# Patient Record
(demographics unavailable — no encounter records)

---

## 2025-03-03 NOTE — PHYSICAL EXAM
[JVD] : no jugular venous distention  [Normal Breath Sounds] : Normal breath sounds [Normal Heart Sounds] : normal heart sounds [Normal Rate and Rhythm] : normal rate and rhythm [No Rash or Lesion] : No rash or lesion [Alert] : alert [Oriented to Person] : oriented to person [Oriented to Place] : oriented to place [Oriented to Time] : oriented to time [Calm] : calm [de-identified] : NAD, well-appearing [de-identified] : ANITHA [de-identified] : soft, ND, NT, +reducible LIH, no palpable R side defect. [de-identified] : no CVAT [de-identified] : full ROM

## 2025-03-03 NOTE — ASSESSMENT
[FreeTextEntry1] : 72-year-old male with history of MS and coronary artery disease presents with increasingly symptomatic reducible left inguinal hernia.  On his previous imaging he also has evidence of a small umbilical hernia which we can repair concomitantly.  Recommend robotic repair.

## 2025-03-03 NOTE — HISTORY OF PRESENT ILLNESS
[de-identified] : This patient is a pleasant 72-year-old male with history of multiple sclerosis, BPH, and coronary artery disease who presents for evaluation of left inguinal hernia that has been increasing in size over the past few months.  He reports that during a previous episode the hernia popped out substantially more but he was able to push it back in.  Denies episodes of incarceration or bowel obstruction.  He is a past smoker but not for 40+ years.  Denies immunosuppression.  Denies previous abdominal surgery. No testicular pain, no dysuria.

## 2025-04-28 NOTE — HISTORY OF PRESENT ILLNESS
[de-identified] : Mr. GAYTAN is a 72 year old man status post robotic left inguinal hernia repair with mesh on 4/8/2025. Denies pain, fever/chills/nausea/emesis or changes in bowel or bladder habits. Tolerating diet. Normal bowel movements.

## 2025-04-28 NOTE — REASON FOR VISIT
[Post Op: _________] : a [unfilled] post op visit [FreeTextEntry1] : Robotic left inguinal hernia repair with mesh. [FreeTextEntry2] : 4/8/2025

## 2025-04-28 NOTE — ASSESSMENT
[FreeTextEntry1] : This is a 72 year old male status post robotic left inguinal hernia repair with mesh, overall doing well.

## 2025-04-28 NOTE — PLAN
[FreeTextEntry1] : Follow up as needed.    I, Dr. Collins, personally performed the evaluation and management (E/M) services for this established patient who presents today with (a) new problem(s)/exacerbation of (an) existing condition(s).  That E/M includes conducting the clinically appropriate interval history &/or exam, assessing all new/exacerbated conditions, and establishing a new plan of care.  Today, my RANDI, Bibiana IRI, was here to observe in the visit & follow plan of care established by me.

## 2025-04-28 NOTE — PHYSICAL EXAM
[Normal Thyroid] : the thyroid was normal [No Rash or Lesion] : No rash or lesion [Alert] : alert [Oriented to Person] : oriented to person [Oriented to Place] : oriented to place [Oriented to Time] : oriented to time [Calm] : calm [JVD] : no jugular venous distention  [de-identified] :  No acute distress [de-identified] :  No respiratory distress [de-identified] :  Regular rate [de-identified] : soft, nontender. no rebound or guarding.  incisions c/d/i [de-identified] :  normal range of motion